# Patient Record
Sex: FEMALE | Race: WHITE | NOT HISPANIC OR LATINO | URBAN - METROPOLITAN AREA
[De-identification: names, ages, dates, MRNs, and addresses within clinical notes are randomized per-mention and may not be internally consistent; named-entity substitution may affect disease eponyms.]

---

## 2022-06-30 PROBLEM — Z00.00 ENCOUNTER FOR PREVENTIVE HEALTH EXAMINATION: Status: ACTIVE | Noted: 2022-06-30

## 2022-07-01 ENCOUNTER — OUTPATIENT (OUTPATIENT)
Dept: OUTPATIENT SERVICES | Facility: HOSPITAL | Age: 52
LOS: 1 days | End: 2022-07-01
Payer: SELF-PAY

## 2022-07-01 VITALS
WEIGHT: 274.92 LBS | HEART RATE: 84 BPM | SYSTOLIC BLOOD PRESSURE: 130 MMHG | DIASTOLIC BLOOD PRESSURE: 84 MMHG | OXYGEN SATURATION: 99 % | RESPIRATION RATE: 17 BRPM | HEIGHT: 68 IN | TEMPERATURE: 98 F

## 2022-07-01 DIAGNOSIS — Z01.818 ENCOUNTER FOR OTHER PREPROCEDURAL EXAMINATION: ICD-10-CM

## 2022-07-01 DIAGNOSIS — Z98.890 OTHER SPECIFIED POSTPROCEDURAL STATES: Chronic | ICD-10-CM

## 2022-07-01 DIAGNOSIS — L98.7 EXCESSIVE AND REDUNDANT SKIN AND SUBCUTANEOUS TISSUE: ICD-10-CM

## 2022-07-01 DIAGNOSIS — Z96.649 PRESENCE OF UNSPECIFIED ARTIFICIAL HIP JOINT: Chronic | ICD-10-CM

## 2022-07-01 DIAGNOSIS — Z90.3 ACQUIRED ABSENCE OF STOMACH [PART OF]: Chronic | ICD-10-CM

## 2022-07-01 LAB — HCG UR QL: NEGATIVE — SIGNIFICANT CHANGE UP

## 2022-07-01 PROCEDURE — 93005 ELECTROCARDIOGRAM TRACING: CPT

## 2022-07-01 PROCEDURE — G0463: CPT

## 2022-07-01 PROCEDURE — 93010 ELECTROCARDIOGRAM REPORT: CPT

## 2022-07-01 PROCEDURE — 81025 URINE PREGNANCY TEST: CPT

## 2022-07-01 NOTE — H&P PST ADULT - FALL HARM RISK - UNIVERSAL INTERVENTIONS
Bed in lowest position, wheels locked, appropriate side rails in place/Call bell, personal items and telephone in reach/Instruct patient to call for assistance before getting out of bed or chair/Non-slip footwear when patient is out of bed/[place] to call system/Physically safe environment - no spills, clutter or unnecessary equipment/Purposeful Proactive Rounding/Room/bathroom lighting operational, light cord in reach

## 2022-07-01 NOTE — H&P PST ADULT - ASSESSMENT
This 51 yo f with a PMHX of HTN, thyroid disease arthritis Anxiety, Depression presents to PST for a scheduled  b/l brachioplasty with Dr Lazaro on 7/11/22 . Pt is electing to have this procedure.

## 2022-07-01 NOTE — H&P PST ADULT - HISTORY OF PRESENT ILLNESS
This 53 yo f with a PMHX of HTN, thyroid disease arthritis Anxiety, Depression presents to PST for a scheduled  b/l brachioplasty with Dr Lazaro on 7/11/22 . Pt is electing to have this procedure.

## 2022-07-01 NOTE — H&P PST ADULT - NSICDXPASTSURGICALHX_GEN_ALL_CORE_FT
PAST SURGICAL HISTORY:  H/O gastric sleeve     History of arthroscopy of knee     History of hip replacement, total

## 2022-07-01 NOTE — H&P PST ADULT - VENOUS THROMBOEMBOLISM CURRENT STATUS
(1) other risk factor (includes escalating BMI, pack-years of smoking, diabetes requiring insulin, chemotherapy, female gender and length of surgery)/(3) other thrombophilia, congenital or acquired

## 2022-07-01 NOTE — H&P PST ADULT - PROBLEM SELECTOR PLAN 1
PST: CBC, CMP, UCG, EKG, PT/INR PTT   Medical evaluation with Dr. Corea  All preoperative instructions including CHD cleanse reviewed with patient who verbalized understanding.  Avoid all NSAID/ASA containing products as well as all herbal/vitamin supplements. Tylenol only for pain/headache.   Covid swab at [place] date TBD. Pt verbalized understanding.

## 2022-07-08 NOTE — ASU PATIENT PROFILE, ADULT - FALL HARM RISK - UNIVERSAL INTERVENTIONS
Bed in lowest position, wheels locked, appropriate side rails in place/Call bell, personal items and telephone in reach/Instruct patient to call for assistance before getting out of bed or chair/Non-slip footwear when patient is out of bed/[place] to call system/Physically safe environment - no spills, clutter or unnecessary equipment/Purposeful Proactive Rounding/Room/bathroom lighting operational, light cord in reach

## 2022-07-10 ENCOUNTER — TRANSCRIPTION ENCOUNTER (OUTPATIENT)
Age: 52
End: 2022-07-10

## 2022-07-11 ENCOUNTER — TRANSCRIPTION ENCOUNTER (OUTPATIENT)
Age: 52
End: 2022-07-11

## 2022-07-11 ENCOUNTER — OUTPATIENT (OUTPATIENT)
Dept: OUTPATIENT SERVICES | Facility: HOSPITAL | Age: 52
LOS: 1 days | End: 2022-07-11
Payer: COMMERCIAL

## 2022-07-11 ENCOUNTER — INPATIENT (INPATIENT)
Facility: HOSPITAL | Age: 52
LOS: 0 days | Discharge: HOME | DRG: 909 | End: 2022-07-12
Attending: PLASTIC SURGERY | Admitting: PLASTIC SURGERY
Payer: COMMERCIAL

## 2022-07-11 VITALS
SYSTOLIC BLOOD PRESSURE: 169 MMHG | DIASTOLIC BLOOD PRESSURE: 125 MMHG | RESPIRATION RATE: 17 BRPM | TEMPERATURE: 98 F | OXYGEN SATURATION: 97 % | WEIGHT: 274.92 LBS | HEART RATE: 95 BPM | HEIGHT: 68 IN

## 2022-07-11 VITALS
HEIGHT: 68 IN | HEART RATE: 81 BPM | TEMPERATURE: 99 F | WEIGHT: 274.92 LBS | SYSTOLIC BLOOD PRESSURE: 139 MMHG | RESPIRATION RATE: 16 BRPM | OXYGEN SATURATION: 98 % | DIASTOLIC BLOOD PRESSURE: 98 MMHG

## 2022-07-11 VITALS — OXYGEN SATURATION: 96 % | HEART RATE: 65 BPM | RESPIRATION RATE: 14 BRPM

## 2022-07-11 DIAGNOSIS — Z96.649 PRESENCE OF UNSPECIFIED ARTIFICIAL HIP JOINT: Chronic | ICD-10-CM

## 2022-07-11 DIAGNOSIS — S40.021A CONTUSION OF RIGHT UPPER ARM, INITIAL ENCOUNTER: ICD-10-CM

## 2022-07-11 DIAGNOSIS — Z98.890 OTHER SPECIFIED POSTPROCEDURAL STATES: Chronic | ICD-10-CM

## 2022-07-11 DIAGNOSIS — L98.7 EXCESSIVE AND REDUNDANT SKIN AND SUBCUTANEOUS TISSUE: ICD-10-CM

## 2022-07-11 DIAGNOSIS — Z90.3 ACQUIRED ABSENCE OF STOMACH [PART OF]: Chronic | ICD-10-CM

## 2022-07-11 LAB
ANION GAP SERPL CALC-SCNC: 9 MMOL/L — SIGNIFICANT CHANGE UP (ref 5–17)
APTT BLD: 27.6 SEC — SIGNIFICANT CHANGE UP (ref 27.5–35.5)
BUN SERPL-MCNC: 21 MG/DL — SIGNIFICANT CHANGE UP (ref 7–23)
CALCIUM SERPL-MCNC: 9.1 MG/DL — SIGNIFICANT CHANGE UP (ref 8.5–10.1)
CHLORIDE SERPL-SCNC: 104 MMOL/L — SIGNIFICANT CHANGE UP (ref 96–108)
CO2 SERPL-SCNC: 22 MMOL/L — SIGNIFICANT CHANGE UP (ref 22–31)
CREAT SERPL-MCNC: 0.8 MG/DL — SIGNIFICANT CHANGE UP (ref 0.5–1.3)
EGFR: 89 ML/MIN/1.73M2 — SIGNIFICANT CHANGE UP
GLUCOSE SERPL-MCNC: 228 MG/DL — HIGH (ref 70–99)
HCG UR QL: NEGATIVE — SIGNIFICANT CHANGE UP
HCT VFR BLD CALC: 41 % — SIGNIFICANT CHANGE UP (ref 34.5–45)
HGB BLD-MCNC: 13.9 G/DL — SIGNIFICANT CHANGE UP (ref 11.5–15.5)
INR BLD: 1.09 RATIO — SIGNIFICANT CHANGE UP (ref 0.88–1.16)
MCHC RBC-ENTMCNC: 29.9 PG — SIGNIFICANT CHANGE UP (ref 27–34)
MCHC RBC-ENTMCNC: 33.9 GM/DL — SIGNIFICANT CHANGE UP (ref 32–36)
MCV RBC AUTO: 88.2 FL — SIGNIFICANT CHANGE UP (ref 80–100)
NRBC # BLD: 0 /100 WBCS — SIGNIFICANT CHANGE UP (ref 0–0)
PLATELET # BLD AUTO: 239 K/UL — SIGNIFICANT CHANGE UP (ref 150–400)
POTASSIUM SERPL-MCNC: 4.8 MMOL/L — SIGNIFICANT CHANGE UP (ref 3.5–5.3)
POTASSIUM SERPL-SCNC: 4.8 MMOL/L — SIGNIFICANT CHANGE UP (ref 3.5–5.3)
PROTHROM AB SERPL-ACNC: 12.8 SEC — SIGNIFICANT CHANGE UP (ref 10.5–13.4)
RBC # BLD: 4.65 M/UL — SIGNIFICANT CHANGE UP (ref 3.8–5.2)
RBC # FLD: 12.7 % — SIGNIFICANT CHANGE UP (ref 10.3–14.5)
SODIUM SERPL-SCNC: 135 MMOL/L — SIGNIFICANT CHANGE UP (ref 135–145)
WBC # BLD: 17.47 K/UL — HIGH (ref 3.8–10.5)
WBC # FLD AUTO: 17.47 K/UL — HIGH (ref 3.8–10.5)

## 2022-07-11 PROCEDURE — 15836 EXC EXCESSIVE SKIN ARM: CPT | Mod: 50

## 2022-07-11 PROCEDURE — 81025 URINE PREGNANCY TEST: CPT

## 2022-07-11 PROCEDURE — 99284 EMERGENCY DEPT VISIT MOD MDM: CPT

## 2022-07-11 DEVICE — SURGICEL POWDER 3 GRAMS: Type: IMPLANTABLE DEVICE | Site: RIGHT | Status: FUNCTIONAL

## 2022-07-11 RX ORDER — LEVOTHYROXINE SODIUM 125 MCG
0 TABLET ORAL
Qty: 0 | Refills: 0 | DISCHARGE

## 2022-07-11 RX ORDER — CEFAZOLIN SODIUM 1 G
3000 VIAL (EA) INJECTION ONCE
Refills: 0 | Status: DISCONTINUED | OUTPATIENT
Start: 2022-07-11 | End: 2022-07-12

## 2022-07-11 RX ORDER — SODIUM CHLORIDE 9 MG/ML
1000 INJECTION, SOLUTION INTRAVENOUS
Refills: 0 | Status: DISCONTINUED | OUTPATIENT
Start: 2022-07-11 | End: 2022-07-11

## 2022-07-11 RX ORDER — CEFAZOLIN SODIUM 1 G
1000 VIAL (EA) INJECTION ONCE
Refills: 0 | Status: DISCONTINUED | OUTPATIENT
Start: 2022-07-11 | End: 2022-07-11

## 2022-07-11 RX ORDER — ACETAMINOPHEN 500 MG
1000 TABLET ORAL ONCE
Refills: 0 | Status: DISCONTINUED | OUTPATIENT
Start: 2022-07-11 | End: 2022-07-11

## 2022-07-11 RX ORDER — ONDANSETRON 8 MG/1
4 TABLET, FILM COATED ORAL ONCE
Refills: 0 | Status: DISCONTINUED | OUTPATIENT
Start: 2022-07-11 | End: 2022-07-11

## 2022-07-11 RX ORDER — HYDROMORPHONE HYDROCHLORIDE 2 MG/ML
0.5 INJECTION INTRAMUSCULAR; INTRAVENOUS; SUBCUTANEOUS ONCE
Refills: 0 | Status: DISCONTINUED | OUTPATIENT
Start: 2022-07-11 | End: 2022-07-11

## 2022-07-11 RX ORDER — VENLAFAXINE HCL 75 MG
0 CAPSULE, EXT RELEASE 24 HR ORAL
Qty: 0 | Refills: 0 | DISCHARGE

## 2022-07-11 RX ORDER — CEFAZOLIN SODIUM 1 G
2000 VIAL (EA) INJECTION ONCE
Refills: 0 | Status: COMPLETED | OUTPATIENT
Start: 2022-07-11 | End: 2022-07-11

## 2022-07-11 RX ADMIN — SODIUM CHLORIDE 75 MILLILITER(S): 9 INJECTION, SOLUTION INTRAVENOUS at 11:08

## 2022-07-11 NOTE — ASU DISCHARGE PLAN (ADULT/PEDIATRIC) - ASU DC SPECIAL INSTRUCTIONSFT
Topical Anesthesia?: BLT gel (benzocaine 20%, lidocaine 6%, tetracaine 4%) Leave dressings clean dry intact  Elevate bilateral arms.  Keep Erica drains pinned to ace wraps.  Do not pin to outer clothes.  Empty and record output and bring to office.   Deep breathing  Ambulation  All prescriptions have been sent from office.  They include pain medication, lovenox.

## 2022-07-11 NOTE — ASU DISCHARGE PLAN (ADULT/PEDIATRIC) - NS MD DC FALL RISK RISK
For information on Fall & Injury Prevention, visit: [email or web address] OR  [email or web address] OR  https://www.cdc.gov/steadi/patient.html

## 2022-07-11 NOTE — ED ADULT NURSE NOTE - OBJECTIVE STATEMENT
pt AOx4 breathing equal and unlabored. pt had bilateral brachioplasty performed today. pt went home with bilateral KARYN drains. pt has coldness and firmness above R arm KARYN drain. R arm KARYN drain is bleeding through ACE Wrap. skin is cool to touch to RUE. pt awaiting plastics. MARIANA, RN

## 2022-07-11 NOTE — ASU DISCHARGE PLAN (ADULT/PEDIATRIC) - CARE PROVIDER_API CALL
Ryley Lazaro)  Plastic Surgery  [address] [place] [address]  Phone: [phone]  Fax: [phone]  Follow Up Time:

## 2022-07-11 NOTE — ED PROVIDER NOTE - OBJECTIVE STATEMENT
53yo female sent from post op with right arm swelling s/p brachioplasty, pt states her arm is now swollen and painful, no tingling or weakness

## 2022-07-12 ENCOUNTER — TRANSCRIPTION ENCOUNTER (OUTPATIENT)
Age: 52
End: 2022-07-12

## 2022-07-12 VITALS
DIASTOLIC BLOOD PRESSURE: 74 MMHG | RESPIRATION RATE: 18 BRPM | WEIGHT: 293 LBS | SYSTOLIC BLOOD PRESSURE: 124 MMHG | OXYGEN SATURATION: 96 % | TEMPERATURE: 98 F | HEART RATE: 93 BPM

## 2022-07-12 DIAGNOSIS — T14.8XXA OTHER INJURY OF UNSPECIFIED BODY REGION, INITIAL ENCOUNTER: ICD-10-CM

## 2022-07-12 PROBLEM — Z86.39 PERSONAL HISTORY OF OTHER ENDOCRINE, NUTRITIONAL AND METABOLIC DISEASE: Chronic | Status: ACTIVE | Noted: 2022-07-01

## 2022-07-12 PROBLEM — F32.A DEPRESSION, UNSPECIFIED: Chronic | Status: ACTIVE | Noted: 2022-07-01

## 2022-07-12 PROBLEM — F41.9 ANXIETY DISORDER, UNSPECIFIED: Chronic | Status: ACTIVE | Noted: 2022-07-01

## 2022-07-12 PROCEDURE — 36415 COLL VENOUS BLD VENIPUNCTURE: CPT

## 2022-07-12 PROCEDURE — 83036 HEMOGLOBIN GLYCOSYLATED A1C: CPT

## 2022-07-12 PROCEDURE — 86900 BLOOD TYPING SEROLOGIC ABO: CPT

## 2022-07-12 PROCEDURE — 99285 EMERGENCY DEPT VISIT HI MDM: CPT | Mod: 25

## 2022-07-12 PROCEDURE — 80048 BASIC METABOLIC PNL TOTAL CA: CPT

## 2022-07-12 PROCEDURE — 86850 RBC ANTIBODY SCREEN: CPT

## 2022-07-12 PROCEDURE — 85730 THROMBOPLASTIN TIME PARTIAL: CPT

## 2022-07-12 PROCEDURE — C1889: CPT

## 2022-07-12 PROCEDURE — 85027 COMPLETE CBC AUTOMATED: CPT

## 2022-07-12 PROCEDURE — 86901 BLOOD TYPING SEROLOGIC RH(D): CPT

## 2022-07-12 PROCEDURE — 85610 PROTHROMBIN TIME: CPT

## 2022-07-12 RX ORDER — ENOXAPARIN SODIUM 100 MG/ML
40 INJECTION SUBCUTANEOUS ONCE
Refills: 0 | Status: COMPLETED | OUTPATIENT
Start: 2022-07-12 | End: 2022-07-12

## 2022-07-12 RX ORDER — ACETAMINOPHEN 500 MG
1000 TABLET ORAL EVERY 6 HOURS
Refills: 0 | Status: DISCONTINUED | OUTPATIENT
Start: 2022-07-12 | End: 2022-07-12

## 2022-07-12 RX ORDER — OXYCODONE HYDROCHLORIDE 5 MG/1
5 TABLET ORAL ONCE
Refills: 0 | Status: DISCONTINUED | OUTPATIENT
Start: 2022-07-12 | End: 2022-07-12

## 2022-07-12 RX ORDER — LEVOTHYROXINE SODIUM 125 MCG
1 TABLET ORAL
Qty: 0 | Refills: 0 | DISCHARGE

## 2022-07-12 RX ORDER — IBUPROFEN 200 MG
400 TABLET ORAL EVERY 8 HOURS
Refills: 0 | Status: DISCONTINUED | OUTPATIENT
Start: 2022-07-12 | End: 2022-07-12

## 2022-07-12 RX ORDER — ONDANSETRON 8 MG/1
4 TABLET, FILM COATED ORAL ONCE
Refills: 0 | Status: DISCONTINUED | OUTPATIENT
Start: 2022-07-12 | End: 2022-07-12

## 2022-07-12 RX ORDER — VENLAFAXINE HCL 75 MG
75 CAPSULE, EXT RELEASE 24 HR ORAL DAILY
Refills: 0 | Status: DISCONTINUED | OUTPATIENT
Start: 2022-07-12 | End: 2022-07-12

## 2022-07-12 RX ORDER — LEVOTHYROXINE SODIUM 125 MCG
100 TABLET ORAL DAILY
Refills: 0 | Status: DISCONTINUED | OUTPATIENT
Start: 2022-07-12 | End: 2022-07-12

## 2022-07-12 RX ORDER — SODIUM CHLORIDE 9 MG/ML
1000 INJECTION, SOLUTION INTRAVENOUS
Refills: 0 | Status: DISCONTINUED | OUTPATIENT
Start: 2022-07-12 | End: 2022-07-12

## 2022-07-12 RX ORDER — HYDROMORPHONE HYDROCHLORIDE 2 MG/ML
0.5 INJECTION INTRAMUSCULAR; INTRAVENOUS; SUBCUTANEOUS
Refills: 0 | Status: DISCONTINUED | OUTPATIENT
Start: 2022-07-12 | End: 2022-07-12

## 2022-07-12 RX ORDER — OXYCODONE HYDROCHLORIDE 5 MG/1
5 TABLET ORAL EVERY 6 HOURS
Refills: 0 | Status: DISCONTINUED | OUTPATIENT
Start: 2022-07-12 | End: 2022-07-12

## 2022-07-12 RX ORDER — CEFAZOLIN SODIUM 1 G
3000 VIAL (EA) INJECTION EVERY 8 HOURS
Refills: 0 | Status: DISCONTINUED | OUTPATIENT
Start: 2022-07-12 | End: 2022-07-12

## 2022-07-12 RX ORDER — ONDANSETRON 8 MG/1
4 TABLET, FILM COATED ORAL EVERY 6 HOURS
Refills: 0 | Status: DISCONTINUED | OUTPATIENT
Start: 2022-07-12 | End: 2022-07-12

## 2022-07-12 RX ADMIN — Medication 1000 MILLIGRAM(S): at 05:16

## 2022-07-12 RX ADMIN — OXYCODONE HYDROCHLORIDE 5 MILLIGRAM(S): 5 TABLET ORAL at 09:13

## 2022-07-12 RX ADMIN — ENOXAPARIN SODIUM 40 MILLIGRAM(S): 100 INJECTION SUBCUTANEOUS at 09:14

## 2022-07-12 RX ADMIN — HYDROMORPHONE HYDROCHLORIDE 0.5 MILLIGRAM(S): 2 INJECTION INTRAMUSCULAR; INTRAVENOUS; SUBCUTANEOUS at 01:23

## 2022-07-12 RX ADMIN — SODIUM CHLORIDE 75 MILLILITER(S): 9 INJECTION, SOLUTION INTRAVENOUS at 01:10

## 2022-07-12 RX ADMIN — HYDROMORPHONE HYDROCHLORIDE 0.5 MILLIGRAM(S): 2 INJECTION INTRAMUSCULAR; INTRAVENOUS; SUBCUTANEOUS at 01:38

## 2022-07-12 RX ADMIN — Medication 1000 MILLIGRAM(S): at 05:57

## 2022-07-12 RX ADMIN — Medication 100 MICROGRAM(S): at 09:14

## 2022-07-12 RX ADMIN — Medication 200 MILLIGRAM(S): at 09:14

## 2022-07-12 RX ADMIN — OXYCODONE HYDROCHLORIDE 5 MILLIGRAM(S): 5 TABLET ORAL at 10:04

## 2022-07-12 NOTE — DISCHARGE NOTE NURSING/CASE MANAGEMENT/SOCIAL WORK - NSDCPEFALRISK_GEN_ALL_CORE
For information on Fall & Injury Prevention, visit: [email or web address] OR  [email or web address] OR  https://www.cdc.gov/steadi/patient.html

## 2022-07-12 NOTE — H&P ADULT - NSHPLABSRESULTS_GEN_ALL_CORE
Data:  T(C): 36.8 (07-11-22 @ 21:50), Max: 37.1 (07-11-22 @ 10:37)  HR: 104 (07-11-22 @ 22:47) (65 - 104)  BP: 147/110 (07-11-22 @ 22:47) (110/60 - 169/125)  RR: 16 (07-11-22 @ 22:47) (14 - 18)  SpO2: 98% (07-11-22 @ 22:47) (94% - 98%)                        13.9   17.47 )-----------( 239      ( 11 Jul 2022 22:45 )             41.0     07-11    135  |  104  |  21  ----------------------------<  228<H>  4.8   |  22  |  0.80    Ca    9.1      11 Jul 2022 22:45    Radiology:  n/a

## 2022-07-12 NOTE — H&P ADULT - NSHPPHYSICALEXAM_GEN_ALL_CORE
GENERAL: No acute distress, well-developed  HEAD:  Atraumatic, Normocephalic  EXTREM: RUE firm to the touch, venous congestion of distal R hand noted. Hand cool to the touch, sensation, ROM intact.  NEUROLOGY: A&O x 3, no focal deficits

## 2022-07-12 NOTE — DISCHARGE NOTE PROVIDER - NSDCCPTREATMENT_GEN_ALL_CORE_FT
PRINCIPAL PROCEDURE  Procedure: Evacuation of hematoma of axilla  Findings and Treatment: RIGHT

## 2022-07-12 NOTE — DISCHARGE NOTE PROVIDER - HOSPITAL COURSE
This 53 yo f with a PMHX of HTN, thyroid disease arthritis Anxiety, Depression presented to PST for a scheduled  b/l brachioplasty with Dr Lazaro on 7/11/22 and approx 6 hours after OR was found to have hematoma on R axilla. Pt states her upper arm feels like a blood pressure cuff is on the tightest setting. Denies loss of sensation in distal arm/hand, no decreased ROM, numbness, tingling. PT admitted and taken to OR    Hospital course:    Patient underwent successful evacuation of hematoma without complications, was sent to PACU then to the floor for monitoring.      Patient was continued on antibiotics, given pain control.  Lab values were monitored.    Disposition: Patient to follow-up with Dr. Lazaro as outpatient.           This 51 yo f with a PMHX of HTN, thyroid disease arthritis Anxiety, Depression presented to PST for a scheduled  b/l brachioplasty with Dr Lazaro on 7/11/22 and approx 6 hours after OR was found to have hematoma on R axilla. Pt states her upper arm feels like a blood pressure cuff is on the tightest setting. Denies loss of sensation in distal arm/hand, no decreased ROM, numbness, tingling. PT admitted and taken to OR    Hospital course:    Patient underwent successful evacuation of hematoma without complications, was sent to PACU then to the floor for monitoring.      Patient was continued on antibiotics, given pain control.  Lab values were monitored. Prophylactic dose of lovenox administered before discharge.    Disposition: Patient to follow-up with Dr. Lazaro as outpatient.

## 2022-07-12 NOTE — PROGRESS NOTE ADULT - SUBJECTIVE AND OBJECTIVE BOX
SURGERY PA NOTE ON BEHALF OF DR. LAZARO:    S: Patient seen and examined at bedside.   Patient returned to OR overnight for developing hematoma in R axilla.  Wound was debrided and returned to floor.  Currently, patient lying comfortably in bed.  Reports RUE feels better this am.  Denies fevers, chills, tingling in RUE, decreased ROM.      MEDICATIONS:  acetaminophen     Tablet .. 1000 milliGRAM(s) Oral every 6 hours  ceFAZolin   IVPB 3000 milliGRAM(s) IV Intermittent every 8 hours  ibuprofen  Tablet. 400 milliGRAM(s) Oral every 8 hours PRN  lactated ringers. 1000 milliLiter(s) IV Continuous <Continuous>  ondansetron Injectable 4 milliGRAM(s) IV Push every 6 hours PRN  oxyCODONE    IR 5 milliGRAM(s) Oral every 6 hours PRN  venlafaxine XR. 75 milliGRAM(s) Oral daily      O:  Vital Signs Last 24 Hrs  T(C): 36.6 (12 Jul 2022 05:25), Max: 37.1 (11 Jul 2022 10:37)  T(F): 97.8 (12 Jul 2022 05:25), Max: 98.8 (11 Jul 2022 10:37)  HR: 93 (12 Jul 2022 05:25) (65 - 104)  BP: 124/74 (12 Jul 2022 05:25) (106/67 - 169/125)  BP(mean): --  RR: 18 (12 Jul 2022 05:25) (12 - 20)  SpO2: 96% (12 Jul 2022 05:25) (94% - 100%)    Parameters below as of 12 Jul 2022 05:25  Patient On (Oxygen Delivery Method): nasal cannula        I&O SUMMARY:    07-11-22 @ 07:01  -  07-12-22 @ 07:00  --------------------------------------------------------  IN: 220 mL / OUT: 30 mL / NET: 190 mL        PHYSICAL EXAM:  Lungs: CTA bilat without W/R/R  Card: S1S2  Abd: Soft, NT, ND.  +BS x 4.  No rebound/guarding.    Ext: Calves soft, NT, without edema bilat    LABS:                        13.9   17.47 )-----------( 239      ( 11 Jul 2022 22:45 )             41.0     07-11    135  |  104  |  21  ----------------------------<  228<H>  4.8   |  22  |  0.80    Ca    9.1      11 Jul 2022 22:45      PT/INR - ( 11 Jul 2022 22:45 )   PT: 12.8 sec;   INR: 1.09 ratio         PTT - ( 11 Jul 2022 22:45 )  PTT:27.6 sec          RADIOLOGY:    Assessment:  52 year old female s/p B/L brachioplasty, returned to OR approximately 6 hours later for hematoma in R axilla, now s/p hematoma evacuation.  Progressing in recovery, with minimal drainage in KARYN drain site.      P:  - Plan for discharge later today    - F/u am labs   - Encouraged IS use  - OOB/ambulate  - VTE ppx   - Will discuss with Dr. Lazaro

## 2022-07-12 NOTE — H&P ADULT - HISTORY OF PRESENT ILLNESS
This 51 yo f with a PMHX of HTN, thyroid disease arthritis Anxiety, Depression presented to PST for a scheduled  b/l brachioplasty with Dr Lazaro on 7/11/22 and approx 6 hours after OR was found to have hematoma on R axilla. Pt states her upper arm feels like a blood pressure cuff is on the tightest setting. Denies loss of sensation in distal arm/hand, no decreased ROM, numbness, tingling.

## 2022-07-12 NOTE — DISCHARGE NOTE NURSING/CASE MANAGEMENT/SOCIAL WORK - NSDCPNINST_GEN_ALL_CORE
any worsening pain or numbness or tightening of both arms, shoortness of breath and chest pain call 911 or [facility]

## 2022-07-12 NOTE — H&P ADULT - ASSESSMENT
This 53 yo f with a PMHX of HTN, thyroid disease arthritis Anxiety, Depression presented to PST for a scheduled  b/l brachioplasty with Dr Lazaro on 7/11/22 and approx 6 hours after OR was found to have hematoma on R axilla.

## 2022-07-12 NOTE — PROGRESS NOTE ADULT - SUBJECTIVE AND OBJECTIVE BOX
The patient was interviewed and evaluated.    52y Female    T(C): 36.6 (07-12-22 @ 05:25), Max: 36.8 (07-11-22 @ 15:16)  HR: 93 (07-12-22 @ 05:25) (65 - 104)  BP: 124/74 (07-12-22 @ 05:25) (106/67 - 169/125)  RR: 18 (07-12-22 @ 05:25) (12 - 20)  SpO2: 96% (07-12-22 @ 05:25) (94% - 100%)  Wt(kg): --    No Nausea/vomiting, recall, sore throat or headache.    No anesthesia related complaints or sequelae.

## 2022-07-12 NOTE — DISCHARGE NOTE PROVIDER - NSDCFUADDINST_GEN_ALL_CORE_FT
empty drains 2x's a day and record outputs empty drains 2x's a day and record outputs  Dressing to remain in place until follow up appointment  empty drains 2x's a day and record outputs  Dressing to remain in place until follow up appointment   Continue Lovenox at home once a day

## 2022-07-12 NOTE — DISCHARGE NOTE NURSING/CASE MANAGEMENT/SOCIAL WORK - PATIENT PORTAL LINK FT
No You can access the FollowMyHealth Patient Portal offered by [facility] by registering at the following website: [email or web address]. By joining [facility]’s FollowMyHealth portal, you will also be able to view your health information using other applications (apps) compatible with our system.

## 2022-07-12 NOTE — H&P ADULT - NSHPREVIEWOFSYSTEMS_GEN_ALL_CORE
Constitutional: Denies fever, fatigue or weight loss.  Skin: Denies rash.  Eyes: Denies recent vision problems or eye pain.  ENT: Denies congestion, ear pain, or sore throat.  Endocrine: Denies thyroid problems.  Cardiovascular: Denies chest pain or palpation.  Respiratory: Denies cough, shortness of breath, congestion, or wheezing.  Gastrointestinal: Denies abdominal pain, nausea, vomiting, or diarrhea.  Genitourinary: Denies dysuria.  Musculoskeletal: Denies joint swelling.  Neurologic: Denies headache.

## 2022-07-12 NOTE — H&P ADULT - PROBLEM SELECTOR PLAN 1
-OR for removal of hematoma  -NPO, IVF, supportive care  -admit overnight  -dc in AM    Surgical Team Contact Information  Spectralink: Ext: [phone] or [phone]

## 2022-07-12 NOTE — PATIENT PROFILE ADULT - FALL HARM RISK - HARM RISK INTERVENTIONS

## 2022-07-12 NOTE — DISCHARGE NOTE PROVIDER - NSDCMRMEDTOKEN_GEN_ALL_CORE_FT
VENLAFAXINE HCL ER 75 MG CAP:    levothyroxine 100 mcg (0.1 mg) oral capsule: 1 cap(s) orally once a day  VENLAFAXINE HCL ER 75 MG CAP:

## 2022-10-03 NOTE — ASU PATIENT PROFILE, ADULT - NSICDXPASTSURGICALHX_GEN_ALL_CORE_FT
PAST SURGICAL HISTORY:  H/O gastric sleeve     History of arthroscopy of knee     History of hip replacement, total      CHANGE IN LEVEL OF CONSCIOUSNESS

## 2023-01-26 NOTE — ED PROVIDER NOTE - CPE EDP PSYCH NORM
Date:  2023    Name:  Kristi Florentino  [place]  [place] [address]    :  1962      Age:   61 y.o.    SSN:        Medical Record Number:  [id number]    Reason for Visit:    Chief Complaint    Follow-up (Lt Shoulder Deltoid Strain)      DOS:2023     HPI: Amina Pichardo is a 61 y.o. female here today for a follow up evaluation of her left shoulder. She reported previously that she had shoulder pain for the past 5-6 weeks associated with no injury. She stated that her pain was exacerbated by taking off her top or reaching back. Her pain was localized at the deltoid insertion point. She denies numbness, tingling or weakness. The pain did not keep her up at night. We initiated conservative treated including formal therapy which she state she has seen significant progress. Pain Assessment  Location of Pain: Shoulder  Location Modifiers: Left  Quality of Pain: Dull  Frequency of Pain: Intermittent  Aggravating Factors: Stretching, Other (Comment) (push/pull)  Limiting Behavior: No  Relieving Factors: Rest, Exercise  Result of Injury: No  Work-Related Injury: No  Are there other pain locations you wish to document?: No  ROS: All systems reviewed on patient intake form. Pertinent items are noted in HPI.         Past Medical History:   Diagnosis Date    Bilateral closed proximal tibial stress fracture 2015    MRI dated 2015 shows a nondisplaced fracture of the medial proximal tibial metaphysis of both knees     Chondromalacia patellae of left knee 2015    Foot pain 10/13/2010    Pain in posterior tibial tendon- Dr. Torito Walton podiatry     Hypothyroidism     Menopausal state age 46    MVP (mitral valve prolapse)     last echo 2006 showed MVP otherwise nl-no regurg or stenosis of MV    Nondisplaced fracture of fifth metatarsal bone, left foot, initial encounter for closed fracture 2019 in [place]  In boot for 10 weeks     Stress fracture of tibia 9/28/2017    Bilateral 2016-followed by Dr. Jomar Oviedo Bilateral stress fractures! Tear of medial meniscus of right knee 7/21/2015    Tendonitis, tibialis 10/13/2010        Past Surgical History:   Procedure Laterality Date    BREAST ENHANCEMENT SURGERY Bilateral     91, 00, 04    COLONOSCOPY  2013    adenomatous polyp 2-13 for 5 yr marva    COLONOSCOPY  2/26/18  repeat x 5  yrs.     DILATION AND CURETTAGE OF UTERUS         Family History   Problem Relation Age of Onset    Thyroid Disease Mother     High Cholesterol Mother     Hypertension Mother     Other Father         Atherosclerotic Disease; Spastic paraparesis of legs     No Known Problems Brother     No Known Problems Son     No Known Problems Daughter        Social History     Socioeconomic History    Marital status:      Spouse name: None    Number of children: None    Years of education: None    Highest education level: None   Tobacco Use    Smoking status: Never    Smokeless tobacco: Never   Substance and Sexual Activity    Alcohol use: Not Currently     Comment: social    Drug use: No    Sexual activity: Yes     Partners: Male     Social Determinants of Health     Physical Activity: Sufficiently Active    Days of Exercise per Week: 3 days    Minutes of Exercise per Session: 50 min   Intimate Partner Violence: Not At Risk    Fear of Current or Ex-Partner: No    Emotionally Abused: No    Physically Abused: No    Sexually Abused: No       Current Outpatient Medications   Medication Sig Dispense Refill    vitamin D (CHOLECALCIFEROL) 25 MCG (1000 UT) TABS tablet Take 1,000 Units by mouth daily      Multiple Vitamins-Minerals (THERAPEUTIC MULTIVITAMIN-MINERALS) tablet Take 1 tablet by mouth daily      zinc gluconate 50 MG tablet Take 50 mg by mouth daily      vitamin C (ASCORBIC ACID) 500 MG tablet Take 500 mg by mouth daily      calcium carbonate (OSCAL) 500 MG TABS tablet Take 500 mg by mouth daily      levothyroxine (SYNTHROID) 75 MCG tablet TAKE 1 TABLET BY MOUTH 6 DAYS A WEEK, THEN 1 1/2 TABLETS BY MOUTH 1 DAY A WEEK 35 tablet 11    liothyronine (CYTOMEL) 5 MCG tablet TAKE 1 TABLET BY MOUTH DAILY 30 tablet 11    nadolol (CORGARD) 20 MG tablet TAKE 1 TABLET BY MOUTH IN THE MORNING 90 tablet 0    omeprazole 20 MG EC tablet TAKE 1 TABLET BY MOUTH TWICE DAILY FOR 2 WEEKS      ANTACID 200-200-20 MG/5ML SUSP suspension SHAKE LIQUID AND TAKE 10 ML BY MOUTH FOUR TIMES DAILY AS NEEDED FOR INDIGESTION      norethindrone-ethinyl estradiol (JINTELI) 1-5 MG-MCG TABS per tablet Take 1 tablet by mouth daily      naproxen (NAPROSYN) 250 MG tablet Take 1 tablet by mouth 2 times daily as needed for Pain 14 tablet 0     No current facility-administered medications for this visit. No Known Allergies    Vital signs:  Ht 5' 7\" (1.702 m)   Wt 170 lb (77.1 kg)   BMI 26.63 kg/m²        Neuro: Alert & oriented x 3,  normal,  no focal deficits noted. Normal affect. Eyes: sclera clear  Ears: Normal external ear  Mouth:  No perioral lesions  Pulm: Respirations unlabored and regular  Pulse: Regular rate    Skin: Warm, well perfused    Left  shoulder exam    Inspection:  Held in a normal posture. Normal contour at the acromioclavicular joint. No swelling, ecchymosis, or erythema about the shoulder. No atrophy appreciated. No scapular winging. Palpation: tenderness over deltoid insertion. No subacromial crepitus. No tenderness of the AC joint. No greater tuberosity tenderness. No tenderness in the bicipital groove. Range of Motion: Full passive and active ROM. Normal scapulothoracic rhythm. Strength:  Normal supraspinatus, infraspinatus, and subscapularis muscle strength. Stability: No anterior instability. No posterior instability. Special Tests: Impingement findings are mildly positive. Labral findings are negative. Speed sign and Yergason signs are both negative. Crossover sign is negative. Belly press sign is negative. Lift off sign is negative.     Other findings: The skin is warm dry and well perfused. 2+ radial pulse. Sensation is intact to light touch over the deltoid. Diagnostics:  Radiology:     No new imaging obtained in the office today       Assessment: 61 y.o female with resolving rotator cuff tendinitis and deltoid strain, left shoulder     Plan: Pertinent imaging was reviewed. The etiology, natural history, and treatment options for the disorder were discussed. The roles of activity medication, antiinflammatories, injections, bracing, physical therapy, and surgical interventions were all described to the patient and questions were answered. Patient has responded very well to the physical therapy and motion has improved. She still has mild tenderness over deltoid insertion, but overall feel she has seen significant progress. We believe that it would be beneficial for her to continue with strengthening exercises. This she can do on her own. She will follow up with us as needed. Mica Carnes is in agreement with this plan. All questions were answered to patient's satisfaction and was encouraged to call with any further questions. Total time spent for evaluation, education, and development of treatment plan: 20 minutes      I Alejandrina Leavitt CMA, am scribing for and in the presence of Dr. Anastacio Patiño MD.    1/26/23 11:12 AM  Alejandrina Leavitt CMA         No orders of the defined types were placed in this encounter. I attest that I met personally with the patient, performed the described exam, reviewed the radiographic studies and medical records associated with this patient and supervised the services that are described above.      Elsa Zaldivar MD normal...

## 2023-06-16 NOTE — ASU PATIENT PROFILE, ADULT - CAREGIVER
Patient has been scheduled for Bilateral L2 transforaminal epidural steroid injection under fluoroscopy  on 6/23/23 at the [facility] with Dr. Maria Ines Mccann. -Anesthesia type: Local.  -Patient informed to fast 12 hours prior to procedure with IVCS/MAC.   -Scheduling [facility] covid testing required for all procedures whether patient is vaccinated or not. -Patient was advised that if he/she does receive the covid vaccine it needs to be at least 2 weeks before or after the injection. -Medications and allergies reviewed. -Patient reminded to hold NSAIDs (Ibuprofen, ASA 81, Aleve, Naproxen, Mobic, Diclofenac, Etodolac, Celebrex etc.) for 3 days prior to Lumbar MBB/Facet if BMI is greater than 35. For Cervical injections only hold multivitamins, Vitamin E, Fish Oil, Phentermine/Lomaira for 7 days prior to injection and NSAIDS.  mg to be held for 7 days prior to injections.  -If patient is receiving MAC/IVCS Phentermine Ozie Ply) will need to be held for 7 days prior to injection.  -If on blood thinner clearance has been received to hold this medication by provider.   -Patient informed he/she will need a  to and from procedure. Charlee Dickerson is located in the [facility] [address] 1st floor,  may park in the yellow/purple parking lot.     Patient verbalized understanding and agrees with plan.  -----> Scheduled in Epic: Yes  -----> Scheduled in [employer]: Yes at [facility] No

## 2024-04-30 NOTE — PATIENT PROFILE ADULT - FALL HARM RISK - TYPE OF ASSESSMENT
Patient informed via phone per Kianna Garcia NP request. Voiced understanding. No concerns or questions at this time.  
Swab showed clue cells indicating bacterial vaginosis. Not an STD but an infection in the vaginal area when pH is disrupted. Rx sent for Flagyl. No alcohol during and for 48-72 hours after treatment. Use unscented soap and no scented products. More showers than baths. No douching.    
Admission

## 2024-12-05 NOTE — ASU PATIENT PROFILE, ADULT - AS SC BRADEN MOBILITY
----- Message from David Haley MD sent at 12/4/2024  7:07 PM CST -----  EMG result consistent with mild carpal tunnel syndrome . Proceed with ortho (Dr Gamble) appt as scheduled.    (4) no limitation

## 2025-02-03 NOTE — DISCHARGE NOTE PROVIDER - NSDCCPCAREPLAN_GEN_ALL_CORE_FT
Subjective:       Patient ID: Nuha Bernstein is a 79 y.o. female.    Chief Complaint: No chief complaint on file.    HPI     4 wk Iritis OU CK    DLS: 02/17/2025  with Dr. Montez      CC: pt reports no eye pain       --blurred Va  --eye pain   --flashes/floaters  --headaches  --curtain/shadow/veils    Meds: PF qd OU    POHx:   1. Iritis OU   S/P Phaco w/IOL OS ( 12/10/2024)   S/P PHACO W/IOL OD ( 11/26/2024)     Last edited by Olivier Montez MD on 2/3/2025  4:58 PM.             Assessment:       1. Post-operative state    2. Refractive error        Plan:       S/p CE OU- Doing well with resolved iritis OU.    RE-Pt wants MRx.      D/c PF OU on 2/5/25.  Give MRx.  RTC Dr Leyva in 6 mos.    
PRINCIPAL DISCHARGE DIAGNOSIS  Diagnosis: Hematoma of arm, right, initial encounter  Assessment and Plan of Treatment:       
none

## 2025-03-01 ENCOUNTER — RX ONLY (RX ONLY)
Age: 55
End: 2025-03-01

## 2025-03-01 ENCOUNTER — OFFICE (OUTPATIENT)
Dept: URBAN - METROPOLITAN AREA CLINIC 12 | Facility: CLINIC | Age: 55
Setting detail: OPHTHALMOLOGY
End: 2025-03-01
Payer: COMMERCIAL

## 2025-03-01 DIAGNOSIS — H18.831: ICD-10-CM

## 2025-03-01 DIAGNOSIS — S05.02XD: ICD-10-CM

## 2025-03-01 PROCEDURE — 92012 INTRM OPH EXAM EST PATIENT: CPT | Performed by: OPTOMETRIST

## 2025-03-01 ASSESSMENT — KERATOMETRY
OD_K1POWER_DIOPTERS: DEFER
OS_AXISANGLE_DEGREES: 057
OS_K1POWER_DIOPTERS: 42.50
OS_K2POWER_DIOPTERS: 43.50

## 2025-03-01 ASSESSMENT — VISUAL ACUITY
OD_BCVA: 20/40+2
OS_BCVA: 20/50-2

## 2025-03-01 ASSESSMENT — CONFRONTATIONAL VISUAL FIELD TEST (CVF)
OD_FINDINGS: FULL
OS_FINDINGS: FULL

## 2025-03-01 ASSESSMENT — CORNEAL TRAUMA - ABRASION: OD_ABRASION: PRESENT

## 2025-03-01 ASSESSMENT — REFRACTION_AUTOREFRACTION
OS_CYLINDER: -1.00
OS_SPHERE: -3.50
OD_SPHERE: DEFER
OS_AXIS: 164

## 2025-03-01 ASSESSMENT — TONOMETRY: OS_IOP_MMHG: 15

## 2025-03-04 ENCOUNTER — OFFICE (OUTPATIENT)
Dept: URBAN - METROPOLITAN AREA CLINIC 12 | Facility: CLINIC | Age: 55
Setting detail: OPHTHALMOLOGY
End: 2025-03-04
Payer: COMMERCIAL

## 2025-03-04 DIAGNOSIS — S05.02XD: ICD-10-CM

## 2025-03-04 DIAGNOSIS — H04.123: ICD-10-CM

## 2025-03-04 DIAGNOSIS — H18.831: ICD-10-CM

## 2025-03-04 DIAGNOSIS — H43.393: ICD-10-CM

## 2025-03-04 PROBLEM — H18.453 NODULAR DEGENERATION OF CORNEA; BOTH EYES: Status: ACTIVE | Noted: 2025-03-04

## 2025-03-04 PROCEDURE — 92014 COMPRE OPH EXAM EST PT 1/>: CPT | Performed by: OPHTHALMOLOGY

## 2025-03-04 ASSESSMENT — VISUAL ACUITY
OD_BCVA: 20/50+2
OS_BCVA: 20/40-1

## 2025-03-04 ASSESSMENT — REFRACTION_AUTOREFRACTION
OD_SPHERE: -3.25
OS_SPHERE: -2.75
OD_AXIS: 054
OS_AXIS: 166
OD_CYLINDER: -1.00
OS_CYLINDER: -1.50

## 2025-03-04 ASSESSMENT — TONOMETRY
OD_IOP_MMHG: 14
OS_IOP_MMHG: 14

## 2025-03-04 ASSESSMENT — KERATOMETRY
OS_K2POWER_DIOPTERS: 43.50
OS_AXISANGLE_DEGREES: 059
OD_K2POWER_DIOPTERS: 42.50
OD_AXISANGLE_DEGREES: 140
OS_K1POWER_DIOPTERS: 42.50
OD_K1POWER_DIOPTERS: 41.50

## 2025-03-04 ASSESSMENT — CONFRONTATIONAL VISUAL FIELD TEST (CVF)
OS_FINDINGS: FULL
OD_FINDINGS: FULL

## 2025-03-04 ASSESSMENT — SUPERFICIAL PUNCTATE KERATITIS (SPK)
OD_SPK: 1+
OS_SPK: 1+

## 2025-03-04 ASSESSMENT — CORNEAL TRAUMA - ABRASION: OD_ABRASION: ABSENT

## (undated) DEVICE — WARMING BLANKET FULL UNDERBODY

## (undated) DEVICE — ABDOMINAL BINDER MED/LG 12" X 45"-62"

## (undated) DEVICE — STAPLER SKIN PROXIMATE

## (undated) DEVICE — DRAPE TOWEL BLUE 17" X 24"

## (undated) DEVICE — DRSG COMBINE 5X9"

## (undated) DEVICE — BLADE SCALPEL SAFETYLOCK #10

## (undated) DEVICE — SUT VLOC 180 3-0 6" P-12 UNDYED

## (undated) DEVICE — ELCTR BOVIE TIP BLADE INSULATED 2.75" EDGE

## (undated) DEVICE — SAFETY PIN 1.5" MED

## (undated) DEVICE — SUT MONOCRYL 2-0 27" UR-6

## (undated) DEVICE — SUT POLYSORB 2-0 30" GS-22 UNDYED

## (undated) DEVICE — SUT PROLENE 1 30" CT-1

## (undated) DEVICE — SUT VLOC 180 3-0 18" P-12 UNDYED

## (undated) DEVICE — DRAIN RESERVOIR FOR JACKSON PRATT 100CC CARDINAL

## (undated) DEVICE — DRAPE 3/4 SHEET W REINFORCEMENT 56X77"

## (undated) DEVICE — SUT POLYSORB 2-0 27" GS-21

## (undated) DEVICE — SUT PLAIN GUT FAST ABSORBING 5-0 PC-1

## (undated) DEVICE — DRSG XEROFORM 5 X 9"

## (undated) DEVICE — DRSG WEBRIL 4"

## (undated) DEVICE — Device

## (undated) DEVICE — ABDOMINAL BINDER MED/LG 9" X 36"-64"

## (undated) DEVICE — SUT MONOCRYL 3-0 18" PS-2 UNDYED

## (undated) DEVICE — SUT QUILL MONODERM 3-0 30CM 24MM

## (undated) DEVICE — SUT POLYSORB 1 36" GS-25

## (undated) DEVICE — DRSG ACE BANDAGE 4"

## (undated) DEVICE — SUT POLYSORB 2-0 30" GS-21 UNDYED

## (undated) DEVICE — SUT POLYSORB 0 30" GS-21 UNDYED

## (undated) DEVICE — DRSG CURITY GAUZE SPONGE 4 X 4" 12-PLY

## (undated) DEVICE — GLV 7.5 PROTEXIS (WHITE)

## (undated) DEVICE — TUBING MICROAIRE ASPIRATION SET 12FT

## (undated) DEVICE — PLV/PSP-ESU FORCE2 FIL 16736T: Type: DURABLE MEDICAL EQUIPMENT

## (undated) DEVICE — SOL IRR POUR NS 0.9% 1000ML

## (undated) DEVICE — SUT QUILL PDO 0 45CM 36MM

## (undated) DEVICE — MARKING PEN W RULER

## (undated) DEVICE — SUT POLYSORB 3-0 18" P-12 UNDYED

## (undated) DEVICE — SUT MONOSOFT 2-0 18" C-15

## (undated) DEVICE — SUT SURGIPRO 1 30" GS-21

## (undated) DEVICE — SUT SOFSILK 2-0 18" C-15

## (undated) DEVICE — SUT POLYSORB 2-0 30" V-20 UNDYED

## (undated) DEVICE — FOLEY TRAY 16FR LF URINE METER SURESTEP

## (undated) DEVICE — ELCTR BOVIE PENCIL SMOKE EVACUATION

## (undated) DEVICE — WARMING BLANKET UPPER ADULT

## (undated) DEVICE — PLV-SCD MACHINE: Type: DURABLE MEDICAL EQUIPMENT

## (undated) DEVICE — PREP BETADINE KIT

## (undated) DEVICE — VENODYNE/SCD SLEEVE CALF MEDIUM

## (undated) DEVICE — VENODYNE/SCD SLEEVE CALF LARGE

## (undated) DEVICE — SUT TICRON 2-0 30" GS-21

## (undated) DEVICE — DRAIN JACKSON PRATT 10MM FLAT FULL NO TROCAR

## (undated) DEVICE — PACK MAJOR ABDOMINAL WITH LAP